# Patient Record
Sex: MALE | Race: WHITE | NOT HISPANIC OR LATINO | Employment: FULL TIME | URBAN - METROPOLITAN AREA
[De-identification: names, ages, dates, MRNs, and addresses within clinical notes are randomized per-mention and may not be internally consistent; named-entity substitution may affect disease eponyms.]

---
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
## 2020-12-27 LAB — RAPID GROUP A STREP (OHS): NEGATIVE

## 2022-01-10 ENCOUNTER — HISTORICAL (OUTPATIENT)
Dept: ADMINISTRATIVE | Facility: HOSPITAL | Age: 22
End: 2022-01-10

## 2022-01-10 LAB — SARS-COV-2 RNA RESP QL NAA+PROBE: POSITIVE

## 2022-04-10 ENCOUNTER — HISTORICAL (OUTPATIENT)
Dept: ADMINISTRATIVE | Facility: HOSPITAL | Age: 22
End: 2022-04-10

## 2022-04-29 VITALS
HEIGHT: 74 IN | BODY MASS INDEX: 23.77 KG/M2 | DIASTOLIC BLOOD PRESSURE: 81 MMHG | OXYGEN SATURATION: 99 % | SYSTOLIC BLOOD PRESSURE: 131 MMHG | WEIGHT: 185.19 LBS

## 2022-09-16 ENCOUNTER — HISTORICAL (OUTPATIENT)
Dept: ADMINISTRATIVE | Facility: HOSPITAL | Age: 22
End: 2022-09-16

## 2023-05-03 PROBLEM — F41.9 MODERATE ANXIETY: Status: ACTIVE | Noted: 2023-05-03

## 2023-05-03 PROBLEM — F32.1 MODERATE MAJOR DEPRESSION: Status: ACTIVE | Noted: 2023-05-03

## 2025-02-12 ENCOUNTER — OFFICE VISIT (OUTPATIENT)
Dept: URGENT CARE | Facility: CLINIC | Age: 25
End: 2025-02-12
Payer: COMMERCIAL

## 2025-02-12 VITALS
BODY MASS INDEX: 33.86 KG/M2 | HEART RATE: 89 BPM | WEIGHT: 250 LBS | OXYGEN SATURATION: 99 % | TEMPERATURE: 98 F | RESPIRATION RATE: 18 BRPM | HEIGHT: 72 IN | SYSTOLIC BLOOD PRESSURE: 121 MMHG | DIASTOLIC BLOOD PRESSURE: 87 MMHG

## 2025-02-12 DIAGNOSIS — B34.9 VIRAL ILLNESS: Primary | ICD-10-CM

## 2025-02-12 DIAGNOSIS — R05.9 COUGH, UNSPECIFIED TYPE: ICD-10-CM

## 2025-02-12 LAB
CTP QC/QA: YES
CTP QC/QA: YES
POC MOLECULAR INFLUENZA A AGN: NEGATIVE
POC MOLECULAR INFLUENZA B AGN: NEGATIVE
SARS CORONAVIRUS 2 ANTIGEN: NEGATIVE

## 2025-02-12 RX ORDER — ONDANSETRON 4 MG/1
4 TABLET, ORALLY DISINTEGRATING ORAL EVERY 6 HOURS PRN
Qty: 12 TABLET | Refills: 0 | Status: SHIPPED | OUTPATIENT
Start: 2025-02-12 | End: 2025-02-15

## 2025-02-12 RX ORDER — PREDNISONE 20 MG/1
40 TABLET ORAL DAILY
Qty: 10 TABLET | Refills: 0 | Status: SHIPPED | OUTPATIENT
Start: 2025-02-12 | End: 2025-02-17

## 2025-02-12 NOTE — PATIENT INSTRUCTIONS
Plan:   Negative flu negative COVID  Likely a viral bug that has been spreading through the community recently  Medications sent to pharmacy  Start the steroids  Take the Zofran as needed for nausea vomiting  Imodium as needed for diarrhea  Hydrate with Pedialyte, Gatorade or powerade. When you decide to eat, keep your diet simple and bland. Bread, crackers, bananas, rice or broth for example. You can take immodium over the counter for non-bloody diarrhea. If there is blood in your diarrhea or vomit, seek medical attention immediately. If you develop abdominal pain or you have worsening of your abdominal pain, seek medical attention immediately in the emergency department. If you have diarrhea and your diarrhea lasts for 5 consecutive days or longer, seek medical attention immediately, you will need stool studies at that time.

## 2025-02-12 NOTE — PROGRESS NOTES
Subjective:      Patient ID: Tomas Andujar is a 24 y.o. male.    Vitals:  height is 6' (1.829 m) and weight is 113.4 kg (250 lb). His temperature is 98.2 °F (36.8 °C). His blood pressure is 121/87 and his pulse is 89. His respiration is 18 and oxygen saturation is 99%.     Chief Complaint: Headache     Patient is a 24 y.o. male who presents to urgent care with complaints of HA, abdominal cramps, nausea, diarrhea, cough, NC x2 days. Alleviating factors include Allegra, ibuprofen with mild amount of relief. Patient denies vomiting, fever, SOB, CP, wheezing      Headache   Associated symptoms include coughing, nausea and sinus pressure.     Constitution: Negative.   HENT:  Positive for sinus pressure.    Neck: neck negative.   Cardiovascular: Negative.    Eyes: Negative.    Respiratory:  Positive for cough.    Gastrointestinal:  Positive for nausea and diarrhea.   Genitourinary: Negative.    Musculoskeletal: Negative.    Skin: Negative.    Allergic/Immunologic: Negative.    Neurological:  Positive for headaches.   Hematologic/Lymphatic: Negative.       Objective:     Physical Exam   Constitutional: He is oriented to person, place, and time. He appears well-developed. He is cooperative.  Non-toxic appearance. He does not appear ill. No distress.   HENT:   Head: Normocephalic and atraumatic.   Ears:   Right Ear: Hearing and external ear normal.   Left Ear: Hearing and external ear normal.   Mouth/Throat: Mucous membranes are normal. Mucous membranes are moist. No oropharyngeal exudate or posterior oropharyngeal erythema (postnasal drip).   Eyes: Conjunctivae and lids are normal.   Neck: Trachea normal and phonation normal. Neck supple. No edema present. No erythema present. No neck rigidity present.   Cardiovascular: Normal rate, regular rhythm and normal heart sounds.   Pulmonary/Chest: Effort normal and breath sounds normal. No stridor. No respiratory distress. He has no decreased breath sounds. He has no wheezes.  He has no rhonchi. He has no rales.   Abdominal: Normal appearance.   Neurological: He is alert and oriented to person, place, and time. He exhibits normal muscle tone.   Skin: Skin is warm, intact and not diaphoretic.   Psychiatric: His speech is normal and behavior is normal. Mood, judgment and thought content normal.   Nursing note and vitals reviewed.         Previous History      Review of patient's allergies indicates:   Allergen Reactions    Cefaclor Other (See Comments)       History reviewed. No pertinent past medical history.  Current Outpatient Medications   Medication Instructions    busPIRone (BUSPAR) 10 mg, Oral, 3 times daily    EScitalopram oxalate (LEXAPRO) 10 mg    ondansetron (ZOFRAN-ODT) 4 mg, Oral, Every 6 hours PRN    predniSONE (DELTASONE) 40 mg, Oral, Daily     Past Surgical History:   Procedure Laterality Date    thumb surgery      WISDOM TOOTH EXTRACTION       Family History   Problem Relation Name Age of Onset    Hypertension Mother         Social History     Tobacco Use    Smoking status: Some Days     Types: Vaping with nicotine    Smokeless tobacco: Never   Substance Use Topics    Alcohol use: Never    Drug use: Never        Physical Exam      Vital Signs Reviewed   /87   Pulse 89   Temp 98.2 °F (36.8 °C)   Resp 18   Ht 6' (1.829 m)   Wt 113.4 kg (250 lb)   SpO2 99%   BMI 33.91 kg/m²        Procedures    Procedures     Labs     Results for orders placed or performed in visit on 02/12/25   POCT Influenza A/B Molecular    Collection Time: 02/12/25  4:10 PM   Result Value Ref Range    POC Molecular Influenza A Ag Negative Negative    POC Molecular Influenza B Ag Negative Negative     Acceptable Yes    SARS Coronavirus 2 Antigen, POCT Manual Read    Collection Time: 02/12/25  4:10 PM   Result Value Ref Range    SARS Coronavirus 2 Antigen Negative Negative, Presumptive Negative     Acceptable Yes        Assessment:     1. Viral illness    2. Cough,  unspecified type        Plan:   Negative flu negative COVID  Likely a viral bug that has been spreading through the community recently  Medications sent to pharmacy  Start the steroids  Take the Zofran as needed for nausea vomiting  Imodium as needed for diarrhea  Hydrate with Pedialyte, Gatorade or powerade. When you decide to eat, keep your diet simple and bland. Bread, crackers, bananas, rice or broth for example. You can take immodium over the counter for non-bloody diarrhea. If there is blood in your diarrhea or vomit, seek medical attention immediately. If you develop abdominal pain or you have worsening of your abdominal pain, seek medical attention immediately in the emergency department. If you have diarrhea and your diarrhea lasts for 5 consecutive days or longer, seek medical attention immediately, you will need stool studies at that time.         Viral illness    Cough, unspecified type  -     POCT Influenza A/B Molecular  -     SARS Coronavirus 2 Antigen, POCT Manual Read    Other orders  -     ondansetron (ZOFRAN-ODT) 4 MG TbDL; Take 1 tablet (4 mg total) by mouth every 6 (six) hours as needed (n/v).  Dispense: 12 tablet; Refill: 0  -     predniSONE (DELTASONE) 20 MG tablet; Take 2 tablets (40 mg total) by mouth once daily. for 5 days  Dispense: 10 tablet; Refill: 0

## 2025-05-08 ENCOUNTER — OFFICE VISIT (OUTPATIENT)
Dept: URGENT CARE | Facility: CLINIC | Age: 25
End: 2025-05-08
Payer: COMMERCIAL

## 2025-05-08 VITALS
BODY MASS INDEX: 33.86 KG/M2 | OXYGEN SATURATION: 98 % | HEIGHT: 72 IN | HEART RATE: 92 BPM | TEMPERATURE: 98 F | SYSTOLIC BLOOD PRESSURE: 128 MMHG | DIASTOLIC BLOOD PRESSURE: 83 MMHG | RESPIRATION RATE: 18 BRPM | WEIGHT: 250 LBS

## 2025-05-08 DIAGNOSIS — R51.9 NONINTRACTABLE HEADACHE, UNSPECIFIED CHRONICITY PATTERN, UNSPECIFIED HEADACHE TYPE: Primary | ICD-10-CM

## 2025-05-08 PROCEDURE — 99213 OFFICE O/P EST LOW 20 MIN: CPT | Mod: ,,, | Performed by: FAMILY MEDICINE

## 2025-05-08 NOTE — PATIENT INSTRUCTIONS
Plan:   Neuro Exam is benign    Recommend staying out of the heat and avoiding strenuous activities for the next few days.  Apply a cold compress to the affected area through 4 times a day for 10-15 minutes.  Tylenol as needed for headache.  As discussed if your headache worsens becomes the worst headache of your life, you have confusion, dizziness, lightheadedness, slurred speech, changes in vision, weakness or numbness in the arms or legs, nausea or vomiting, go to the emergency department immediately

## 2025-05-08 NOTE — PROGRESS NOTES
Subjective:      Patient ID: Tomas Andujar is a 24 y.o. male.    Vitals:  height is 6' (1.829 m) and weight is 113.4 kg (250 lb). His temperature is 98.1 °F (36.7 °C). His blood pressure is 128/83 and his pulse is 92. His respiration is 18 and oxygen saturation is 98%.     Chief Complaint: Headache    24-year-old male presents to clinic complaining of headache after bumping his head.  Patient states 2 days ago while working on a tractor he stood up and hit the top of his head.  Patient denies any loss of consciousness nausea vomiting worst headache of his life weakness or numbness in the arms or legs lightheadedness changes in vision slurred speech or confusion.  Denies any bleeding.  States he felt a little dizzy afterwards but that resolved.  Patient is concerned because he still has a mild headache.  States it is not a bad headache but that has not resolve so he decided to come in for evaluation.  Patient Is not on a blood thinner.    Headache       Constitution: Negative.   HENT: Negative.     Neck: neck negative.   Cardiovascular: Negative.    Eyes: Negative.    Respiratory: Negative.     Gastrointestinal: Negative.    Genitourinary: Negative.    Musculoskeletal: Negative.    Skin: Negative.  Negative for erythema (abrasion on the top of the scalp.  There is no significant swelling.  There is no bruising.  There is no bleeding.).   Allergic/Immunologic: Negative.    Neurological:  Positive for headaches.   Hematologic/Lymphatic: Negative.       Objective:     Physical Exam   Constitutional: He is oriented to person, place, and time.  Non-toxic appearance. He does not appear ill. No distress.   HENT:   Head: Normocephalic and atraumatic.   Abdominal: Normal appearance.   Neurological: no focal deficit. He is alert and oriented to person, place, and time. He displays no weakness and normal reflexes. No cranial nerve deficit or sensory deficit. Coordination and gait normal.   Skin: Skin is not diaphoretic. No  erythema (abrasion on the top of the scalp.  There is no significant swelling.  There is no bruising.  There is no bleeding.)   Psychiatric: His behavior is normal. Mood, judgment and thought content normal.   Vitals reviewed.         Previous History      Review of patient's allergies indicates:   Allergen Reactions    Cefaclor Other (See Comments)       History reviewed. No pertinent past medical history.  Current Outpatient Medications   Medication Instructions    busPIRone (BUSPAR) 10 mg, Oral, 3 times daily    EScitalopram oxalate (LEXAPRO) 10 mg     Past Surgical History:   Procedure Laterality Date    thumb surgery      WISDOM TOOTH EXTRACTION       Family History   Problem Relation Name Age of Onset    Hypertension Mother         Social History[1]     Physical Exam      Vital Signs Reviewed   /83   Pulse 92   Temp 98.1 °F (36.7 °C)   Resp 18   Ht 6' (1.829 m)   Wt 113.4 kg (250 lb)   SpO2 98%   BMI 33.91 kg/m²        Procedures    Procedures     Labs     Results for orders placed or performed in visit on 02/12/25   POCT Influenza A/B Molecular    Collection Time: 02/12/25  4:10 PM   Result Value Ref Range    POC Molecular Influenza A Ag Negative Negative    POC Molecular Influenza B Ag Negative Negative     Acceptable Yes    SARS Coronavirus 2 Antigen, POCT Manual Read    Collection Time: 02/12/25  4:10 PM   Result Value Ref Range    SARS Coronavirus 2 Antigen Negative Negative, Presumptive Negative     Acceptable Yes        Assessment:     1. Nonintractable headache, unspecified chronicity pattern, unspecified headache type        Plan:   Neuro Exam is benign    Recommend staying out of the heat and avoiding strenuous activities for the next few days.  Apply a cold compress to the affected area through 4 times a day for 10-15 minutes.  Tylenol as needed for headache.  As discussed if your headache worsens becomes the worst headache of your life, you have confusion,  dizziness, lightheadedness, slurred speech, changes in vision, weakness or numbness in the arms or legs, nausea or vomiting, go to the emergency department immediately  Nonintractable headache, unspecified chronicity pattern, unspecified headache type                         [1]   Social History  Tobacco Use    Smoking status: Every Day     Types: Vaping with nicotine    Smokeless tobacco: Never   Substance Use Topics    Alcohol use: Never    Drug use: Never